# Patient Record
Sex: FEMALE | Race: WHITE | URBAN - METROPOLITAN AREA
[De-identification: names, ages, dates, MRNs, and addresses within clinical notes are randomized per-mention and may not be internally consistent; named-entity substitution may affect disease eponyms.]

---

## 2017-12-27 ENCOUNTER — HISTORICAL (OUTPATIENT)
Dept: ADMINISTRATIVE | Facility: HOSPITAL | Age: 82
End: 2017-12-27

## 2019-03-07 ENCOUNTER — HISTORICAL (OUTPATIENT)
Dept: ADMINISTRATIVE | Facility: HOSPITAL | Age: 84
End: 2019-03-07

## 2019-12-02 ENCOUNTER — HISTORICAL (OUTPATIENT)
Dept: ADMINISTRATIVE | Facility: HOSPITAL | Age: 84
End: 2019-12-02

## 2019-12-11 ENCOUNTER — HISTORICAL (OUTPATIENT)
Dept: ADMINISTRATIVE | Facility: HOSPITAL | Age: 84
End: 2019-12-11

## 2020-01-14 ENCOUNTER — HISTORICAL (OUTPATIENT)
Dept: ADMINISTRATIVE | Facility: HOSPITAL | Age: 85
End: 2020-01-14

## 2020-01-27 ENCOUNTER — HISTORICAL (OUTPATIENT)
Dept: ADMINISTRATIVE | Facility: HOSPITAL | Age: 85
End: 2020-01-27

## 2020-05-13 ENCOUNTER — HISTORICAL (OUTPATIENT)
Dept: ADMINISTRATIVE | Facility: HOSPITAL | Age: 85
End: 2020-05-13

## 2020-05-18 LAB
FINAL CULTURE: NORMAL
FINAL CULTURE: NORMAL

## 2020-07-07 ENCOUNTER — HISTORICAL (OUTPATIENT)
Dept: ADMINISTRATIVE | Facility: HOSPITAL | Age: 85
End: 2020-07-07

## 2022-09-13 NOTE — HISTORICAL [facility] CERNER
This is a historical note converted from Hector. Formatting and pictures may have been removed.  Please reference Hector for original formatting and attached multimedia. Chief Complaint  Pt here for 3 wk hospital f/u for sacral fx due to falling out of a chair. Pt is ambulating with a walker. Pt states she is currently taking advil for pain with little relief.  History of Present Illness  This patients had a fall now has complaints of pain in her low back she apparently had a x-ray taken along with CT scan.? She has been diagnosed with a sacral fracture. ?She is presently on a walker. ?She also has osteoarthritis in her right knee has been having significant problems with knee pain the past she has had cortisone injections which have helped her. ?She is not really taking vitamins or calcium.  Review of Systems  Review of Systems?  ?????Constitutional: ?No fever, No chills. ?  ?????Respiratory: ?No shortness of breath, No cough. ?  ?????Cardiovascular: ?No chest pain. ?  ?????Gastrointestinal: ?No nausea, No vomiting, No diarrhea, No constipation, No heartburn. ?  ?????Genitourinary: ?No dysuria, No hematuria. ?  ?????Hematology/Lymphatics: ?No bleeding tendency. ?  ?????Endocrine: ?No polyuria. ?  ?????Neurologic: ?Alert and oriented X4, No numbness, No tingling. ?  ?????Psychiatric: ?No anxiety, No depression. ?  ?????Integumentary: no abnormalities except as noted in history of present illness  Physical Exam  Vitals & Measurements  T:?36.6? ?C (Oral)? HR:?78(Peripheral)? BP:?113/60?  HT:?168?cm? WT:?79?kg? BMI:?27.99?  Examination the patient lower lumbar spine she has pain across the low sacral area she does not have pubic pain she is good flexion rotation of her hips her right knee is swollen she has 10 degrees?flexion contracture he can only flexes to 120 degrees.  ?  I injected lidocaine Celestone cc lidocaine to see Celestone into the right knee.  ?  The patient return in 1 month repeat x-rays of her sacrum  x-rays today of the sacrum reveal?bilateral sacral madison fractures with extensive osteoporosis.? I talked her about being on calcium vitamin D and she is can consult with Dr. Ott tomorrow.  Assessment/Plan  1.?Osteoarthritis of right knee?M17.11  Ordered:  betamethasone, 12 mg, Intra-Articular, Once, first dose 01/14/20 17:00:00 CST, stop date 01/14/20 17:00:00 CST  Lidocaine inj., 2 mL, Intra-Articular, Once, first dose 01/14/20 16:54:00 CST, stop date 01/14/20 16:54:00 CST  Clinic Follow up, *Est. 02/14/20 3:00:00 CST, Order for future visit, Osteoarthritis of right knee, [facility]  Office/Outpatient Visit Level 4 Established [id number] PC, Osteoarthritis of right knee, [facility], 01/14/20 16:54:00 CST  XR Pelvis Minimum 3 Views, Routine, *Est. 01/14/20 3:00:00 CST, Fracture, None, Wheelchair, Rad Type, Order for future visit, Osteoarthritis of right knee, Not Scheduled, *Est. 01/14/20 3:00:00 CST  ?  Orders:  asp/inj jnt/bursa, major [id number] PC, 01/14/20 16:54:00 CST, [facility], Routine, 01/14/20 16:54:00 CST  Referrals  Clinic Follow up, *Est. 02/14/20 3:00:00 CST, Order for future visit, Osteoarthritis of right knee, [facility]   Problem List/Past Medical History  Ongoing  Aftercare following joint replacement  Asthma  COPD  CVA - Cerebrovascular accident  Depression  Hypertension  Hypothyroidism  Obesity  Osteoarthritis of right knee  Primary osteoarthritis of left knee  Primary osteoarthritis of left knee  TIA  Unilateral primary osteoarthritis, left knee  Historical  No qualifying data  Procedure/Surgical History  Biopsy Gastrointestinal (04/02/2017)  Esophagogastroduodenoscopy (04/02/2017)  Excision of Stomach, Via Natural or Artificial Opening Endoscopic, Diagnostic (04/02/2017)  Replacement of Left Knee Joint with Synthetic Substitute, Cemented, Open Approach (07/11/2016)  Total Knee Arthroplasty (Left) (07/11/2016)  HOSPITAL OBSERVATION SERVICE, PER HOUR  (08/27/2015)  Bronchoscopy Fluoroscopy (08/22/2014)  Bronchoscopy w/ Alveolar Lavage (08/22/2014)  Bronchoscopy with Brushings (08/22/2014)  Bronchoscopy, rigid or flexible, including fluoroscopic guidance, when performed; with bronchial alveolar lavage. (08/22/2014)  Bronchoscopy, rigid or flexible, including fluoroscopic guidance, when performed; with brushing or protected brushings. (08/22/2014)  Bronchoscopy, rigid or flexible, including fluoroscopic guidance, when performed; with computer-assisted, image-guided navigation (List separately in addition to code for primary procedure[s]). (08/22/2014)  Bronchoscopy, Super Dimension (08/22/2014)  Closed [endoscopic] biopsy of bronchus (08/22/2014)  Computer assisted surgery with fluoroscopy (08/22/2014)  Magnetic resonance imaging of brain and brain stem (08/14/2014)  Magnetic resonance imaging of other and unspecified sites (08/14/2014)  Appendectomy  back surgery  bilateral cataracts  Cholecystectomy  Hysterectomy   Medications  albuterol 2.5 mg/3 mL (0.083%) inhalation solution, 2.5 mg= 3 mL, NEB, q6hr Resp, PRN  amlodipine 5 mg oral tablet, 5 mg= 1 tab(s), Oral, Daily  aspirin 81 mg oral Delayed Release (EC) tablet, 81 mg= 1 tab(s), Oral, Daily  atorvastatin 20 mg oral tablet, 20 mg= 1 tab(s), Oral, Daily  budesonide 0.5 mg/2 mL inhalation suspension, 0.5 mg= 2 mL, NEB, BID,? ?Not taking: Last Dose Date/Time Unknown  Celestone, 12 mg, Intra-Articular, Once  clopidogrel 75 mg oral tablet, 75 mg= 1 tab(s), Oral, Daily  Colace 100 mg oral capsule, 100 mg= 1 cap(s), Oral, BID  levothyroxine 125 mcg (0.125 mg) oral tablet, 125 mcg= 1 tab(s), Oral, Daily  lidocaine 2% injectable solution, 2 mL, Intra-Articular, Once  lidocaine 5% topical film, 1 patch(es), TOP, Daily  magnesium oxide 400 mg oral tablet, 200 mg= 0.5 tab(s), Oral, Daily  methocarbamol 500 mg oral tablet, 500 mg= 1 tab(s), Oral, TID  montelukast 10 mg oral TABLET, 10 mg= 1 tab(s), Oral, Daily  Neurontin  100 mg oral capsule, 100 mg= 1 cap(s), Oral, At Bedtime,? ?Not Taking, Completed Rx  Perforomist 20 mcg/2 mL inhalation solution, 20 mcg= 2 mL, NEB, BID-Resp,? ?Not Taking, Completed Rx  sertraline 100 mg oral tablet, 100 mg= 1 tab(s), Oral, Daily, 4 refills  sucralfate 1 g oral tablet, 1 gm= 1 tab(s), Oral, TID  Tessalon Perles 100 mg oral capsule, 100 mg= 1 cap(s), Oral, TID  Zofran, 4 mg= 2 mL, IV Push, Once  Zofran ODT 4 mg oral tablet, disintegrating, 4 mg= 1 tab(s), Oral, q8hr, PRN  Allergies  Augmentin  Social History  Abuse/Neglect  No, 01/14/2020  No, No, Yes, 12/19/2019  No, No, Yes, 12/19/2019  No, 12/11/2019  Alcohol - Denies Alcohol Use, 08/13/2014  Never, 05/11/2015  Employment/School  Retired, 05/11/2015  Exercise  Home/Environment  Lives with Alone., 05/11/2015  Nutrition/Health  Regular, 05/11/2015  Substance Use - Denies Substance Abuse, 08/13/2014  Never, 05/11/2015  Tobacco - Denies Tobacco Use, 08/13/2014  Never (less than 100 in lifetime), N/A, 01/14/2020  Family History  Primary malignant neoplasm of lung: Mother and Father.  Immunizations  Vaccine Date Status Comments   tuberculin purified protein derivative 12/19/2019 Given    influenza virus vaccine, inactivated 11/06/2019 Given    influenza virus vaccine, inactivated 10/17/2017 Given    influenza virus vaccine, inactivated 11/09/2016 Given    tetanus/diphtheria/pertussis, acel(Tdap) 08/27/2015 Given    pneumococcal 23-polyvalent vaccine - Not Given Patient Refuses     pneumococcal 23-polyvalent vaccine 08/14/2014 Given Patient Not Available/Off Unit   influenza virus vaccine, inactivated 11/14/2008 Recorded    Health Maintenance  Health Maintenance  ???Pending?(in the next year)  ??? ??OverDue  ??? ? ? ?COPD Maintenance-Spirometry due??and every?  ??? ? ? ?Coronary Artery Disease Maintenance-Antiplatelet Agent Prescribed due??and every?  ??? ? ? ?Coronary Artery Disease Maintenance-Lipid Lowering Therapy due??and every?  ??? ? ?  ?Pneumococcal Vaccine due??and every?  ??? ? ? ?COPD Maintenance-Pulmonary Rehab Education due??03/13/18??and every 1??year(s)  ??? ? ? ?Zoster Vaccine due??03/13/18??and every 100??year(s)  ??? ? ? ?Diabetes Maintenance-Fasting Lipid Profile due??06/13/18??and every 1??year(s)  ??? ? ? ?Advance Directive due??01/01/20??and every 1??year(s)  ??? ? ? ?Geriatric Depression Screening due??01/01/20??and every 1??year(s)  ??? ??Due?  ??? ? ? ?Cognitive Screening due??01/01/20??and every 1??year(s)  ??? ? ? ?Fall Risk Assessment due??01/01/20??and every 1??year(s)  ??? ? ? ?Functional Assessment due??01/01/20??and every 1??year(s)  ??? ? ? ?Bone Density Screening due??01/14/20??Variable frequency  ??? ? ? ?Diabetes Maintenance-Eye Exam due??01/14/20??and every?  ??? ??Due In Future?  ??? ? ? ?Diabetes Maintenance-Foot Exam not due until??08/04/20??and every 1??year(s)  ??? ? ? ?ADL Screening not due until??12/06/20??and every 1??year(s)  ??? ? ? ?Diabetes Maintenance-HgbA1c not due until??12/19/20??and every 1??year(s)  ??? ? ? ?Hypertension Management-BMP not due until??12/22/20??and every 1??year(s)  ??? ? ? ?Hypertension Management-Blood Pressure not due until??12/26/20??and every 1??year(s)  ??? ? ? ?COPD Management-COPD Medications Prescribed not due until??12/26/20??and every 1??year(s)  ??? ? ? ?COPD Management-Oxygen Assessment not due until??12/27/20??and every 1??year(s)  ??? ? ? ?Obesity Screening not due until??01/01/21??and every 1??year(s)  ???Satisfied?(in the past 1 year)  ??? ??Satisfied?  ??? ? ? ?ADL Screening on??12/06/19.??Satisfied by Sandrine Andino  ??? ? ? ?Advance Directive on??12/06/19.??Satisfied by Sandrine Andino  ??? ? ? ?Asthma Management-Asthma Medication Prescribed on??12/26/19.??Satisfied by Janice Holland  ??? ? ? ?Blood Pressure Screening on??01/14/20.??Satisfied by Janeen Lyons  ??? ? ? ?Body Mass Index Check on??01/14/20.??Satisfied by Janeen Lyons  ??? ? ? ?COPD  Management-Oxygen Assessment on??12/27/19.??Satisfied by Allison Braun  ??? ? ? ?COPD Management-COPD Medications Prescribed on??12/26/19.??Satisfied by Janice Holland  ??? ? ? ?Cognitive Screening on??12/06/19.??Satisfied by Sandrine Andino  ??? ? ? ?Coronary Artery Disease Maintenance-Lipid Lowering Therapy on??12/26/19.??Satisfied by Alonso Godoy LPN  ??? ? ? ?Depression Screening on??12/06/19.??Satisfied by Sandrine Andino  ??? ? ? ?Diabetes Maintenance-HgbA1c on??12/20/19.??Satisfied by Kiki Prado  ??? ? ? ?Diabetes Screening on??12/23/19.??Satisfied by Kiki Prado  ??? ? ? ?Fall Risk Assessment on??12/27/19.??Satisfied by Nelia Saavedra LPN  ??? ? ? ?Geriatric Depression Screening on??12/06/19.??Satisfied by Sandrine Andino  ??? ? ? ?Hypertension Management-Blood Pressure on??01/14/20.??Satisfied by Janeen Lyons  ??? ? ? ?Influenza Vaccine on??11/06/19.??Satisfied by Lia Saavedra LPN  ??? ? ? ?Obesity Screening on??01/14/20.??Satisfied by Janeen Lyons  ??? ? ? ?Pneumococcal Vaccine on??12/19/19.??Satisfied by Orly Mcnamara RN  ?      Right knee injection  Using sterile technique after informed verbal consent. Risks discussed with patient prior to injection. Informed him the following instruction to follow: ??  If you are not already on anti-inflammatory do the following 3 to 4 tablets of Ibuprofen every 6 to 8 hours or 2 Aleve every 12 hours. If unable to take anti-inflammatory take 2 extra strength Tylenol every 8 hours for the next 5 days.?  Apply ice to the affected area 3 to 4 times a day for the next 5 days.?  Make sure and move your knee this is the best way to get the medication where it should be, riding a bike or a stationary bike is also helpful.?  Patient voiced understanding.

## 2022-09-13 NOTE — HISTORICAL [facility]
This is a historical note converted from Hector. Formatting and pictures may have been removed.  Please reference Hector for original formatting and attached multimedia. Chief Complaint  PT HERE FOR RT KNEE PAIN X 6 MONTHS, NO TRAMA, X-RAY TODAY..CV  History of Present Illness  Knee symptoms ::knee gives way ??? Knee joint pain on the right ??? Worse with weightbearing ??? Worse in the morning  ??? Slowly worsens with extended activity ??? Is increased by bending it ??? By twisting it ??? By kneeling ??? With stairs ??? By squatting  ??? Knee joint swelling on the right??  ?? Knee joint pain not improved by rest ? ??? No clicking sensation in the right knee ??? No grating sensation in the right knee?  ??? The knee did not suddenly buckle due to contact ?? No popping sound was heard in the knee?  ??? No tingling ??? No burning sensation  Review of Systems  Review of Systems?  ?????Constitutional: ?No fever, No chills. ?  ?????Respiratory: ?No shortness of breath, No cough. ?  ?????Cardiovascular: ?No chest pain. ?  ?????Gastrointestinal: ?No nausea, No vomiting, No diarrhea, No constipation, No heartburn. ?  ?????Genitourinary: ?No dysuria, No hematuria. ?  ?????Hematology/Lymphatics: ?No bleeding tendency. ?  ?????Endocrine: ?No polyuria. ?  ?????Neurologic: ?Alert and oriented X4, No numbness, No tingling. ?  ?????Psychiatric: ?No anxiety, No depression. ?  ?????Integumentary: no abnormalities except as noted in history of present illness  Physical Exam  Vitals & Measurements  HR:?74(Peripheral)? BP:?143/67?  HT:?168?cm? WT:?78.62?kg? BMI:?27.86?  PHYSICAL FINDINGS  Cardiovascular:  Arterial Pulses: ? Dorsalis pedis pulses were normal right. ? Dorsalis pedis pulses were normal right.  Musculoskeletal System:  Hips:  General/bilateral: ? No swelling of the hips. ? No induration of the hips.  Right Hip: ? Motion was normal. ? No pain was elicited by active internal rotation with the hip flexed.  ? No pain was  elicited by active external rotation with the hip flexed. ? No pain was elicited by active motion. ? No pain was elicited by passive motion.  Left Hip: ? Motion was normal.  Right Knee: ? Examined. ? Positive effusion. ?no localized swelling. ?Genu varum. ?Patella demonstrated crepitus. ?Anteromedial aspect was tender on palpation. ?Medial aspect was tender on palpation.  patella  Right Knee:  Knee Motion: Value Normal Range  Active flexion?115 degrees  Active extension??10?degrees  ???  ? Pain was elicited throughout the range of motion. ? Swelling with a negative fluctuation test. ? No erythema. ? No warmth. ? Anterior aspect was not tender on palpation. ? Patellofemoral region was tender on palpation. ? Medial collateral ligament was not tender on palpation. ? Lateral collateral ligament was not tender on palpation. ? No pain was elicited by motion using Scot apprehension test. ? No laxity of the posterior cruciate ligament. ? No anterior drawer sign was present. ? No one plane medial (straight) instability. ? No one plane lateral (straight) instability. ? A Lachman test did not demonstrate one plane anterior instability. ? Clarkes sign was observed for chondromalacia.  ???  ???  TESTS  Imaging:  X-Ray Knee:  AP and lateral view x-rays of the right knee with sunrise view of the patella were performed -of right knee.  ???  IMPRESSIONS RADIOLOGY TEST  Narrowing of the right knee joint space bone on bone, showed sclerosis of the right knee, and osteophytes arising from the right knee.  ?   right knee injection  Administered corticosteroid injection?? ? 2cc cortisone and 2cc lidocaine using sterile technique after informed verbal consent. Risks discussed with patient prior to injection. Informed the patient of the following:  -?Explained to patient that this injection in some patients will experience increased pain a few minutes after the injection and that the pain will last anywhere from 10 to 20 minutes. In  order to decrease the pain you need to do the following:  o?Make sure to move the extremity in which the injection was given. If you do not move the medication sits there and can cause more pain.  o?Ice the affected joint every 2 hours for 20 minutes at a time for the next 24 hours.  o?If you are not already taking an anti-inflammatory take the following Ibuprofen/ Advil take 3 to 4 tablets every 6 to 8 hours or 2 Aleve every 12 hours for the next 5 days to help the medication work. If you cannot take anti-inflammatory take 2 extra strength Tylenol every 6 hours for the next 5 days.  ??Patient voiced understanding ?????????????????????????????????????????????????????????????????????????????  Assessment/Plan  1.?Osteoarthritis of right knee?M17.11  Ordered:  dexamethasone, 8 mg, Intra-Articular, Once, first dose 03/07/19 15:00:00 CST, stop date 03/07/19 15:00:00 CST  Lidocaine inj., 5 mL, Intra-Articular, Once, first dose 03/07/19 14:03:00 CST, stop date 03/07/19 14:03:00 CST  asp/inj jnt/bursa, major [id number] PC, 03/07/19 14:03:00 CST, [facility], Routine, 03/07/19 14:03:00 CST  Clinic Follow up, 03/07/19 14:02:00 CST, prn, Osteoarthritis of right knee, [facility]  Office/Outpatient Visit Level 3 Established [id number] PC, Osteoarthritis of right knee, [facility], 03/07/19 14:02:00 CST  ?  Right knee pain?M25.561  Ordered:  XR Knee Right 4 or More Views, Routine, 03/07/19 13:26:00 CST, Pain, None, Patient Bed, Patient Has IV?, Rad Type, Right knee pain, 03/07/19 13:26:00 CST  ?   Problem List/Past Medical History  Ongoing  Aftercare following joint replacement  Asthma  COPD  CVA - Cerebrovascular accident  Depression  Hypertension  Hypothyroidism  Obesity  Osteoarthritis of right knee  Primary osteoarthritis of left knee  Primary osteoarthritis of left knee  TIA  Unilateral primary osteoarthritis, left knee  Historical  No qualifying data  Procedure/Surgical History  Biopsy Gastrointestinal  (04/02/2017)  Esophagogastroduodenoscopy (04/02/2017)  Total Knee Arthroplasty (Left) (07/11/2016)  Bronchoscopy Fluoroscopy (08/22/2014)  Bronchoscopy w/ Alveolar Lavage (08/22/2014)  Bronchoscopy with Brushings (08/22/2014)  Bronchoscopy, Super Dimension (08/22/2014)  Appendectomy  back surgery  bilateral cataracts  Cholecystectomy  Hysterectomy   Medications  acetaminophen/butalbital/caffeine 325 mg-50 mg-40 mg oral capsule, 1 cap(s), Oral, q4hr, PRN, 1 refills  albuterol 90 mcg/inh inhalation aerosol, 1 puff(s), INH, q6hr, PRN  amlodipine 5 mg oral tablet, See Instructions, 5 refills  Aspir 81 oral delayed release tablet, 81 mg= 1 tab(s), Oral, Daily  atorvastatin 20 mg oral tablet, See Instructions, 5 refills  [id number] -[id number]  Carafate 1 g oral tablet, 1 gm= 1 tab(s), Oral, TIDAC, 6 refills  clopidogrel 75 mg oral tablet, See Instructions  dexamethasone, 8 mg, Intra-Articular, Once  Dulera 200 mcg-5 mcg/inh inhalation aerosol, 2 puff(s), INH, BID, 3 refills  DuoNeb 0.5 mg-2.5 mg/3 mL inhalation solution, 3 mL, INH, QID, 2 refills  Iron 100 Plus, 1 tab(s), Oral, Daily  [facility] hydrochlorothiazide 12.5 mg oral CAP, See Instructions, 5 refills  levothyroxine 112 mcg (0.112 mg) oral tablet, See Instructions, 5 refills  Lidocaine inj., 5 mL, Intra-Articular, Once  magnesium oxide, 250 mg, Oral, Daily  montelukast 10 mg oral TABLET, See Instructions  Pantoprazole 40 mg ORAL EC-Tablet, 40 mg= 1 tab(s), Oral, Daily  ProAir HFA 90 mcg/inh inhalation aerosol with adapter, 2 puff(s), INH, q6hr, PRN, 2 refills  sertraline 100 mg oral tablet, 200 mg= 2 tab(s), Oral, Daily, 3 refills  SERTRALINE HCL 50 MG TABLET  Symbicort 160 mcg-4.5 mcg/inh inhalation aerosol, 1 puff(s), INH, BID, 3 refills  traMADol 50 mg oral tablet, 50 mg= 1 tab(s), Oral, q12hr, PRN,? ?Not Taking, Completed Rx: Last Dose Date/Time Unknown  Zofran, 4 mg= 2 mL, IV Push, Once  Zofran 4 mg oral tablet, 4 mg= 1 tab(s), Oral, q8hr,  PRN  Allergies  Augmentin  Social History  Alcohol - Denies Alcohol Use, 08/13/2014  Never, 05/11/2015  Employment/School  Retired, 05/11/2015  Exercise  Home/Environment  Lives with Alone., 05/11/2015  Nutrition/Health  Regular, 05/11/2015  Substance Abuse - Denies Substance Abuse, 08/13/2014  Never, 05/11/2015  Tobacco - Denies Tobacco Use, 08/13/2014  Never (less than 100 in lifetime), N/A, 03/07/2019  Never (less than 100 in lifetime), N/A, 01/30/2019  Never smoker Use:., 09/11/2018  Family History  Primary malignant neoplasm of lung: Mother and Father.  Immunizations  Vaccine Date Status Comments   influenza virus vaccine, inactivated 10/17/2017 Given    influenza virus vaccine, inactivated 11/09/2016 Given    tetanus/diphtheria/pertussis, acel(Tdap) 08/27/2015 Given    pneumococcal 23-polyvalent vaccine - Not Given Patient Refuses     pneumococcal 23-polyvalent vaccine 08/14/2014 Given Patient Not Available/Off Unit   Health Maintenance  Health Maintenance  ???Pending?(in the next year)  ??? ??OverDue  ??? ? ? ?COPD Maintenance-Spirometry due??and every?  ??? ? ? ?Coronary Artery Disease Maintenance-Antiplatelet Agent Prescribed due??and every?  ??? ? ? ?Coronary Artery Disease Maintenance-Lipid Lowering Therapy due??and every?  ??? ? ? ?Pneumococcal Vaccine due??and every?  ??? ? ? ?COPD Maintenance-Pulmonary Rehab Education due??03/13/18??and every 1??year(s)  ??? ? ? ?Zoster Vaccine due??03/13/18??and every 100??year(s)  ??? ? ? ?Hypertension Management-BMP due??12/27/18??and every 1??year(s)  ??? ??Due?  ??? ? ? ?ADL Screening due??03/07/19??and every 1??year(s)  ??? ? ? ?Bone Density Screening due??03/07/19??Variable frequency  ??? ? ? ?Cognitive Screening due??03/07/19??and every 1??year(s)  ??? ??Due In Future?  ??? ? ? ?Functional Assessment not due until??06/20/19??and every 1??year(s)  ??? ? ? ?Geriatric Depression Screening not due until??09/11/19??and every 1??year(s)  ??? ? ? ?Advance Directive  not due until??09/11/19??and every 1??year(s)  ??? ? ? ?COPD Management-COPD Medications Prescribed not due until??09/11/19??and every 1??year(s)  ??? ? ? ?COPD Management-Oxygen Assessment not due until??01/30/20??and every 1??year(s)  ??? ? ? ?Fall Risk Assessment not due until??01/30/20??and every 1??year(s)  ??? ? ? ?Hypertension Management-Blood Pressure not due until??03/06/20??and every 1??year(s)  ???Satisfied?(in the past 1 year)  ??? ??Satisfied?  ??? ? ? ?Advance Directive on??09/11/18.??Satisfied by Amie Horton LPN  ??? ? ? ?Asthma Management-Asthma Medication Prescribed on??09/11/18.??Satisfied by Milla Boucher NP  ??? ? ? ?Blood Pressure Screening on??03/07/19.??Satisfied by Chalrotte Danielson LPN  ??? ? ? ?Body Mass Index Check on??03/07/19.??Satisfied by Charlotte Danielson LPN  ??? ? ? ?COPD Management-COPD Medications Prescribed on??09/11/18.??Satisfied by Milla Boucher NP  ??? ? ? ?COPD Management-Oxygen Assessment on??01/30/19.??Satisfied by Lia Saavedra LPN  ??? ? ? ?Coronary Artery Disease Maintenance-Antiplatelet Agent Prescribed on??02/25/19.??Satisfied by Marquez Ott MD  ??? ? ? ?Coronary Artery Disease Maintenance-Lipid Lowering Therapy on??09/10/18.??Satisfied by Marquez Ott MD  ??? ? ? ?Depression Screening on??01/30/19.??Satisfied by Lia Saavedra LPN  ??? ? ? ?Fall Risk Assessment on??01/30/19.??Satisfied by Lia Saavedra LPN  ??? ? ? ?Functional Assessment on??06/20/18.??Satisfied by Lia Saavedra LPN  ??? ? ? ?Geriatric Depression Screening on??09/11/18.??Satisfied by Amie Horton LPN  ??? ? ? ?Hypertension Management-Blood Pressure on??03/07/19.??Satisfied by Charlotte Danielson LPN  ??? ? ? ?Influenza Vaccine on??01/30/19.??Satisfied by Lia Saavedra LPN  ??? ? ? ?Obesity Screening on??03/07/19.??Satisfied by Charlotte Danielson LPN  ?  ?